# Patient Record
Sex: FEMALE | Race: WHITE | NOT HISPANIC OR LATINO | Employment: STUDENT | URBAN - NONMETROPOLITAN AREA
[De-identification: names, ages, dates, MRNs, and addresses within clinical notes are randomized per-mention and may not be internally consistent; named-entity substitution may affect disease eponyms.]

---
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
## 2024-02-06 ENCOUNTER — APPOINTMENT (OUTPATIENT)
Dept: GENERAL RADIOLOGY | Facility: HOSPITAL | Age: 19
End: 2024-02-06
Payer: COMMERCIAL

## 2024-02-06 ENCOUNTER — HOSPITAL ENCOUNTER (EMERGENCY)
Facility: HOSPITAL | Age: 19
Discharge: HOME | End: 2024-02-07
Attending: EMERGENCY MEDICINE | Admitting: EMERGENCY MEDICINE
Payer: COMMERCIAL

## 2024-02-06 VITALS
DIASTOLIC BLOOD PRESSURE: 98 MMHG | TEMPERATURE: 98.2 F | RESPIRATION RATE: 18 BRPM | HEIGHT: 65 IN | OXYGEN SATURATION: 99 % | BODY MASS INDEX: 20.83 KG/M2 | WEIGHT: 125 LBS | SYSTOLIC BLOOD PRESSURE: 139 MMHG | HEART RATE: 88 BPM

## 2024-02-06 DIAGNOSIS — M25.511 ACUTE PAIN OF RIGHT SHOULDER: Primary | ICD-10-CM

## 2024-02-06 PROCEDURE — 99283 EMERGENCY DEPT VISIT LOW MDM: CPT

## 2024-02-06 PROCEDURE — 73030 X-RAY EXAM OF SHOULDER: CPT

## 2024-02-07 PROCEDURE — 25010000002 KETOROLAC TROMETHAMINE PER 15 MG: Performed by: NURSE PRACTITIONER

## 2024-02-07 PROCEDURE — 96372 THER/PROPH/DIAG INJ SC/IM: CPT

## 2024-02-07 RX ORDER — PREDNISONE 5 MG/1
1 TABLET ORAL DAILY
Qty: 48 TABLET | Refills: 0 | Status: SHIPPED | OUTPATIENT
Start: 2024-02-07

## 2024-02-07 RX ORDER — PREDNISONE 5 MG/1
1 TABLET ORAL DAILY
Qty: 48 TABLET | Refills: 0 | Status: SHIPPED | OUTPATIENT
Start: 2024-02-07 | End: 2024-02-07 | Stop reason: SDUPTHER

## 2024-02-07 RX ORDER — KETOROLAC TROMETHAMINE 30 MG/ML
30 INJECTION, SOLUTION INTRAMUSCULAR; INTRAVENOUS ONCE
Status: COMPLETED | OUTPATIENT
Start: 2024-02-07 | End: 2024-02-07

## 2024-02-07 RX ADMIN — KETOROLAC TROMETHAMINE 30 MG: 30 INJECTION, SOLUTION INTRAMUSCULAR; INTRAVENOUS at 00:31

## 2024-02-07 NOTE — ED PROVIDER NOTES
"Subjective:  History of Present Illness:    Patient is a 18-year-old female with history of chronic shoulder pain.  Patient reports originally that she had a chair accident which she injured her right shoulder.  Over the last day or so shoulder has became much more sore in the infraspinatus region.  Reports that she does have range of motion.  Distal neurovascular intact and distal circulation is intact.  Denies OTC medication home remedy.  Denies alleviating exacerbating factors.    Nurses Notes reviewed and agree, including vitals, allergies, social history and prior medical history.     REVIEW OF SYSTEMS: All systems reviewed and not pertinent unless noted.  Review of Systems   Musculoskeletal:  Positive for arthralgias.        Right shoulder pain   All other systems reviewed and are negative.      History reviewed. No pertinent past medical history.    Allergies:    Patient has no known allergies.      History reviewed. No pertinent surgical history.      Social History     Socioeconomic History    Marital status: Single   Tobacco Use    Smoking status: Never    Smokeless tobacco: Never   Substance and Sexual Activity    Alcohol use: No         History reviewed. No pertinent family history.    Objective  Physical Exam:  /98 (BP Location: Left arm, Patient Position: Sitting)   Pulse 88   Temp 98.2 °F (36.8 °C) (Oral)   Resp 18   Ht 165.1 cm (65\")   Wt 56.7 kg (125 lb)   LMP 01/12/2024 (Exact Date)   SpO2 99%   BMI 20.80 kg/m²      Physical Exam  Vitals and nursing note reviewed.   Constitutional:       Appearance: Normal appearance. She is normal weight.   HENT:      Head: Normocephalic and atraumatic.      Nose: Nose normal.      Mouth/Throat:      Mouth: Mucous membranes are moist.      Pharynx: Oropharynx is clear.   Eyes:      Extraocular Movements: Extraocular movements intact.      Conjunctiva/sclera: Conjunctivae normal.      Pupils: Pupils are equal, round, and reactive to light. "   Cardiovascular:      Rate and Rhythm: Normal rate and regular rhythm.   Pulmonary:      Effort: Pulmonary effort is normal.      Breath sounds: Normal breath sounds.   Abdominal:      General: Abdomen is flat. Bowel sounds are normal.      Palpations: Abdomen is soft.   Musculoskeletal:         General: Normal range of motion.      Cervical back: Normal range of motion and neck supple.      Comments: Right shoulder range of motion is intact.  Distal neurovascular intact.  Circulation is intact.   Skin:     General: Skin is warm and dry.      Capillary Refill: Capillary refill takes less than 2 seconds.   Neurological:      General: No focal deficit present.      Mental Status: She is alert and oriented to person, place, and time. Mental status is at baseline.   Psychiatric:         Mood and Affect: Mood normal.         Behavior: Behavior normal.         Thought Content: Thought content normal.         Judgment: Judgment normal.         Procedures    ED Course:         Lab Results (last 24 hours)       ** No results found for the last 24 hours. **             No radiology results from the last 24 hrs       MDM      Initial impression of presenting illness: Patient is a 18-year-old female with history of chronic shoulder pain.  Patient reports originally that she had a chair accident which she injured her right shoulder.  Over the last day or so shoulder has became much more sore in the infraspinatus region.  Reports that she does have range of motion.  Distal neurovascular intact and distal circulation is intact.  Denies OTC medication home remedy.  Denies alleviating exacerbating factors.    DDX: includes but is not limited to: Strain, sprain, fracture or other    Patient arrives stable with vitals interpreted by myself.     Pertinent features from physical exam: Range of motion in right shoulder is intact.  Distal neurovascular is intact.  Circulation is intact.    Initial diagnostic plan: X-ray of right  shoulder    Results from initial plan were reviewed and interpreted by me revealing no acute fracture    Diagnostic information from other sources: Chart review    Interventions / Re-evaluation: Vital signs stable throughout encounter    Results/clinical rationale were discussed with patient    Consultations/Discussion of results with other physicians: N/A    Disposition plan: Patient is hemodynamically stable nontoxic-appearing appropriate discharge.  Will send patient home in sling.  Will send home with steroid Dosepak.  Follow-up with PCP in 1 week.  Follow-up with ER for any worse symptoms.  -----        Final diagnoses:   Acute pain of right shoulder          Pepe Martins, APRN  02/07/24 0016